# Patient Record
Sex: MALE | Race: WHITE | HISPANIC OR LATINO
[De-identification: names, ages, dates, MRNs, and addresses within clinical notes are randomized per-mention and may not be internally consistent; named-entity substitution may affect disease eponyms.]

---

## 2022-09-07 PROBLEM — Z00.00 ENCOUNTER FOR PREVENTIVE HEALTH EXAMINATION: Status: ACTIVE | Noted: 2022-09-07

## 2022-09-08 ENCOUNTER — APPOINTMENT (OUTPATIENT)
Dept: UROLOGY | Facility: CLINIC | Age: 62
End: 2022-09-08

## 2022-09-08 VITALS
SYSTOLIC BLOOD PRESSURE: 157 MMHG | WEIGHT: 145 LBS | TEMPERATURE: 95 F | BODY MASS INDEX: 24.75 KG/M2 | HEIGHT: 64 IN | DIASTOLIC BLOOD PRESSURE: 84 MMHG | HEART RATE: 108 BPM

## 2022-09-08 DIAGNOSIS — Z78.9 OTHER SPECIFIED HEALTH STATUS: ICD-10-CM

## 2022-09-08 DIAGNOSIS — Z63.5 DISRUPTION OF FAMILY BY SEPARATION AND DIVORCE: ICD-10-CM

## 2022-09-08 PROCEDURE — 76872 US TRANSRECTAL: CPT

## 2022-09-08 PROCEDURE — 51741 ELECTRO-UROFLOWMETRY FIRST: CPT

## 2022-09-08 PROCEDURE — 99204 OFFICE O/P NEW MOD 45 MIN: CPT | Mod: 25

## 2022-09-08 PROCEDURE — 51798 US URINE CAPACITY MEASURE: CPT | Mod: 59

## 2022-09-08 SDOH — SOCIAL STABILITY - SOCIAL INSECURITY: DISRUPTION OF FAMILY BY SEPARATION AND DIVORCE: Z63.5

## 2022-09-08 NOTE — ASSESSMENT
[FreeTextEntry1] : 61 yr old male presents with BPH and severe LUTS. TRUS today showed 102 g prostate. He is a good candidate for aquablation procedure. We reviewed the procedure in detail. We discussed risks, benefits, alternatives of procedure. Patient is not interesting in lifelong medication and would like to proceed with definitive treatment. Preop labs and urine were done today. He will need medical clearance from PCP. Tentative date of procedure 10/11/22 at [facility].

## 2022-09-08 NOTE — PHYSICAL EXAM
[General Appearance - Well Developed] : well developed [General Appearance - Well Nourished] : well nourished [Normal Appearance] : normal appearance [Well Groomed] : well groomed [General Appearance - In No Acute Distress] : no acute distress [Edema] : no peripheral edema [Respiration, Rhythm And Depth] : normal respiratory rhythm and effort [Exaggerated Use Of Accessory Muscles For Inspiration] : no accessory muscle use [Abdomen Soft] : soft [Abdomen Tenderness] : non-tender [Costovertebral Angle Tenderness] : no ~M costovertebral angle tenderness [Urethral Meatus] : meatus normal [Penis Abnormality] : normal circumcised penis [Scrotum] : the scrotum was normal [Testes Tenderness] : no tenderness of the testes [Testes Mass (___cm)] : there were no testicular masses [Prostate Tenderness] : the prostate was not tender [No Prostate Nodules] : no prostate nodules [Prostate Size ___ (0-4)] : prostate size [unfilled] (scale: 0-4) [Normal Station and Gait] : the gait and station were normal for the patient's age [] : no rash [No Focal Deficits] : no focal deficits [Oriented To Time, Place, And Person] : oriented to person, place, and time [Affect] : the affect was normal [Mood] : the mood was normal [Not Anxious] : not anxious

## 2022-09-08 NOTE — LETTER BODY
[Dear  ___] : Dear  [unfilled], [Consult Letter:] : I had the pleasure of evaluating your patient, [unfilled]. [Please see my note below.] : Please see my note below. [Consult Closing:] : Thank you very much for allowing me to participate in the care of this patient.  If you have any questions, please do not hesitate to contact me. [Sincerely,] : Sincerely, [FreeTextEntry3] : Salo Diallo MD

## 2022-09-08 NOTE — REVIEW OF SYSTEMS
[Wake up at night to urinate  How many times?  ___] : wakes up to urinate [unfilled] times during the night [Strong urge to urinate] : strong urge to urinate [Bladder fullness after urinating] : bladder fullness after urinating [see HPI] : see HPI [Hesitancy] : urinary hesitancy [Nocturia] : nocturia [Negative] : Genitourinary

## 2022-09-08 NOTE — HISTORY OF PRESENT ILLNESS
[FreeTextEntry1] : 61 yr old male with CC of aquablation consult. Patient has history of BPH with severe LUTS. He voids with weak stream, frequency, post-void dribbling, nocturia 4X.  Last PSA 4/2022 2.81ng/ml. He has been on tamsulosin in the past after episode of retention but is not interested in being on lifetime medication. He had SE of retrograde ejaculation.  He denies dysuria, gross hematuria. He had a UTI a few years ago. No history of stones. Since UTI/retention he has been an saw palmetto with minimal relief. He was offered Urolift but decided against it. He is interested in aquablation.\par \par IPSS: 24 \par \par Uroflow\par Max: 2.1 ml/s\par Avg: 3.7 ml/s\par Volume: 47 ml\par PVR: 189 ml\par \par TRUS: 102 gram prostate \par \par \par PMH: denies \par PSH: denies\par FH: denies family hx of prostate CA\par Meds: saw palmetto \par Allergies: NKDA\par Social: nonsmoker, rare alcohol, works in [place] [employer]

## 2022-09-09 LAB
ALBUMIN SERPL ELPH-MCNC: 4.8 G/DL
ALP BLD-CCNC: 102 U/L
ALT SERPL-CCNC: 22 U/L
ANION GAP SERPL CALC-SCNC: 15 MMOL/L
APPEARANCE: CLEAR
APTT BLD: 34.7 SEC
AST SERPL-CCNC: 26 U/L
BACTERIA: NEGATIVE
BASOPHILS # BLD AUTO: 0.04 K/UL
BASOPHILS NFR BLD AUTO: 0.6 %
BILIRUB SERPL-MCNC: 0.8 MG/DL
BILIRUBIN URINE: NEGATIVE
BLOOD URINE: NEGATIVE
BUN SERPL-MCNC: 16 MG/DL
CALCIUM SERPL-MCNC: 10.2 MG/DL
CHLORIDE SERPL-SCNC: 101 MMOL/L
CO2 SERPL-SCNC: 25 MMOL/L
COLOR: NORMAL
CREAT SERPL-MCNC: 1.11 MG/DL
EGFR: 76 ML/MIN/1.73M2
EOSINOPHIL # BLD AUTO: 0.03 K/UL
EOSINOPHIL NFR BLD AUTO: 0.4 %
GLUCOSE QUALITATIVE U: NEGATIVE
GLUCOSE SERPL-MCNC: 91 MG/DL
HCT VFR BLD CALC: 49.7 %
HGB BLD-MCNC: 16.2 G/DL
HYALINE CASTS: 0 /LPF
IMM GRANULOCYTES NFR BLD AUTO: 0.3 %
INR PPP: 0.97 RATIO
KETONES URINE: NORMAL
LEUKOCYTE ESTERASE URINE: NEGATIVE
LYMPHOCYTES # BLD AUTO: 1.56 K/UL
LYMPHOCYTES NFR BLD AUTO: 22.3 %
MAN DIFF?: NORMAL
MCHC RBC-ENTMCNC: 29.3 PG
MCHC RBC-ENTMCNC: 32.6 GM/DL
MCV RBC AUTO: 90 FL
MICROSCOPIC-UA: NORMAL
MONOCYTES # BLD AUTO: 0.4 K/UL
MONOCYTES NFR BLD AUTO: 5.7 %
NEUTROPHILS # BLD AUTO: 4.96 K/UL
NEUTROPHILS NFR BLD AUTO: 70.7 %
NITRITE URINE: NEGATIVE
PH URINE: 6
PLATELET # BLD AUTO: 311 K/UL
POTASSIUM SERPL-SCNC: 4.2 MMOL/L
PROT SERPL-MCNC: 7.8 G/DL
PROTEIN URINE: NORMAL
PT BLD: 11.4 SEC
RBC # BLD: 5.52 M/UL
RBC # FLD: 13.5 %
RED BLOOD CELLS URINE: 3 /HPF
SODIUM SERPL-SCNC: 141 MMOL/L
SPECIFIC GRAVITY URINE: 1.02
SQUAMOUS EPITHELIAL CELLS: 0 /HPF
UROBILINOGEN URINE: NORMAL
WBC # FLD AUTO: 7.01 K/UL
WHITE BLOOD CELLS URINE: 1 /HPF

## 2022-09-12 LAB — BACTERIA UR CULT: NORMAL

## 2022-10-06 DIAGNOSIS — Z41.9 ENCOUNTER FOR PROCEDURE FOR PURPOSES OTHER THAN REMEDYING HEALTH STATE, UNSPECIFIED: ICD-10-CM

## 2022-10-10 ENCOUNTER — TRANSCRIPTION ENCOUNTER (OUTPATIENT)
Age: 62
End: 2022-10-10

## 2022-10-10 NOTE — ASU PATIENT PROFILE, ADULT - INTERNATIONAL TRAVEL
KEELY informed pt ready to return to [facility]. KEELY called [facility] to [phone] to transport. ETA 200pm. KEELY informed nurse of transport.   No

## 2022-10-11 ENCOUNTER — APPOINTMENT (OUTPATIENT)
Dept: UROLOGY | Facility: AMBULATORY SURGERY CENTER | Age: 62
End: 2022-10-11

## 2022-10-11 ENCOUNTER — INPATIENT (INPATIENT)
Facility: HOSPITAL | Age: 62
LOS: 0 days | Discharge: HOME | DRG: 714 | End: 2022-10-12
Attending: UROLOGY | Admitting: UROLOGY
Payer: COMMERCIAL

## 2022-10-11 ENCOUNTER — RESULT REVIEW (OUTPATIENT)
Age: 62
End: 2022-10-11

## 2022-10-11 ENCOUNTER — TRANSCRIPTION ENCOUNTER (OUTPATIENT)
Age: 62
End: 2022-10-11

## 2022-10-11 VITALS
RESPIRATION RATE: 16 BRPM | TEMPERATURE: 97 F | DIASTOLIC BLOOD PRESSURE: 82 MMHG | WEIGHT: 150.8 LBS | HEART RATE: 78 BPM | SYSTOLIC BLOOD PRESSURE: 133 MMHG | OXYGEN SATURATION: 98 % | HEIGHT: 64 IN

## 2022-10-11 DIAGNOSIS — N40.0 BENIGN PROSTATIC HYPERPLASIA WITHOUT LOWER URINARY TRACT SYMPTOMS: ICD-10-CM

## 2022-10-11 PROCEDURE — 52601 PROSTATECTOMY (TURP): CPT

## 2022-10-11 PROCEDURE — 88305 TISSUE EXAM BY PATHOLOGIST: CPT | Mod: 26

## 2022-10-11 PROCEDURE — S2900 ROBOTIC SURGICAL SYSTEM: CPT

## 2022-10-11 RX ORDER — HYDROMORPHONE HYDROCHLORIDE 2 MG/ML
0.5 INJECTION INTRAMUSCULAR; INTRAVENOUS; SUBCUTANEOUS ONCE
Refills: 0 | Status: DISCONTINUED | OUTPATIENT
Start: 2022-10-11 | End: 2022-10-11

## 2022-10-11 RX ORDER — SODIUM CHLORIDE 9 MG/ML
1000 INJECTION INTRAMUSCULAR; INTRAVENOUS; SUBCUTANEOUS
Refills: 0 | Status: DISCONTINUED | OUTPATIENT
Start: 2022-10-11 | End: 2022-10-12

## 2022-10-11 RX ORDER — ACETAMINOPHEN 500 MG
650 TABLET ORAL EVERY 6 HOURS
Refills: 0 | Status: DISCONTINUED | OUTPATIENT
Start: 2022-10-11 | End: 2022-10-11

## 2022-10-11 RX ORDER — ATROPA BELLADONNA AND OPIUM 16.2; 6 MG/1; MG/1
1 SUPPOSITORY RECTAL EVERY 12 HOURS
Refills: 0 | Status: DISCONTINUED | OUTPATIENT
Start: 2022-10-11 | End: 2022-10-12

## 2022-10-11 RX ORDER — HEPARIN SODIUM 5000 [USP'U]/ML
5000 INJECTION INTRAVENOUS; SUBCUTANEOUS ONCE
Refills: 0 | Status: COMPLETED | OUTPATIENT
Start: 2022-10-11 | End: 2022-10-11

## 2022-10-11 RX ORDER — HEPARIN SODIUM 5000 [USP'U]/ML
5000 INJECTION INTRAVENOUS; SUBCUTANEOUS EVERY 8 HOURS
Refills: 0 | Status: DISCONTINUED | OUTPATIENT
Start: 2022-10-11 | End: 2022-10-12

## 2022-10-11 RX ORDER — OXYBUTYNIN CHLORIDE 5 MG
5 TABLET ORAL EVERY 8 HOURS
Refills: 0 | Status: DISCONTINUED | OUTPATIENT
Start: 2022-10-11 | End: 2022-10-12

## 2022-10-11 RX ORDER — ACETAMINOPHEN 500 MG
1000 TABLET ORAL ONCE
Refills: 0 | Status: COMPLETED | OUTPATIENT
Start: 2022-10-11 | End: 2022-10-11

## 2022-10-11 RX ORDER — ACETAMINOPHEN 500 MG
650 TABLET ORAL EVERY 6 HOURS
Refills: 0 | Status: DISCONTINUED | OUTPATIENT
Start: 2022-10-11 | End: 2022-10-12

## 2022-10-11 RX ADMIN — Medication 5 MILLIGRAM(S): at 13:21

## 2022-10-11 RX ADMIN — Medication 400 MILLIGRAM(S): at 14:15

## 2022-10-11 RX ADMIN — Medication 1 TABLET(S): at 19:27

## 2022-10-11 RX ADMIN — HYDROMORPHONE HYDROCHLORIDE 0.5 MILLIGRAM(S): 2 INJECTION INTRAMUSCULAR; INTRAVENOUS; SUBCUTANEOUS at 14:26

## 2022-10-11 RX ADMIN — Medication 1000 MILLIGRAM(S): at 14:30

## 2022-10-11 RX ADMIN — SODIUM CHLORIDE 80 MILLILITER(S): 9 INJECTION INTRAMUSCULAR; INTRAVENOUS; SUBCUTANEOUS at 17:32

## 2022-10-11 RX ADMIN — HYDROMORPHONE HYDROCHLORIDE 0.5 MILLIGRAM(S): 2 INJECTION INTRAMUSCULAR; INTRAVENOUS; SUBCUTANEOUS at 14:30

## 2022-10-11 NOTE — PROGRESS NOTE ADULT - SUBJECTIVE AND OBJECTIVE BOX
Post OP/Procedure note: DEENA [id number]    Patient reports to minimal abdominal and suprapubic discomfort appropriate to current state. He denies any nausea, vomiting, chest pain, sob, dizziness, weakness.     PE  GEN: NAD  ABD: soft, nt, nd  : 22fr 3 way catheter in, CBI on, urine light punch    T(C): 36.3 (10-11-22 @ 15:51), Max: 36.3 (10-11-22 @ 15:51)  HR: 78 (10-11-22 @ 15:51) (62 - 86)  BP: 135/88 (10-11-22 @ 15:51) (118/66 - 160/98)  RR: 17 (10-11-22 @ 15:51) (12 - 27)  SpO2: 99% (10-11-22 @ 15:51) (95% - 100%)    10-11-22 @ 07:01  -  10-11-22 @ 18:43  --------------------------------------------------------  IN: 640 mL / OUT: 0 mL / NET: 640 mL

## 2022-10-11 NOTE — H&P ADULT - NSHPPHYSICALEXAM_GEN_ALL_CORE
well developed, well nourished, normal appearance, well groomed and no acute distress.   Cardiovascular. no peripheral edema.   Pulmonary: no respiratory distress, normal respiratory rhythm and effort and no accessory muscle use.   Abdomen: soft, non-tender and no costovertebral angle tenderness.   Genitourinary: meatus normal, normal circumcised penis, the scrotum was normal, no tenderness of the testes, there were no testicular masses, the prostate was not tender and no prostate nodules . prostate size 2 1/2+ (scale: 0-4).   Musculoskeletal:. the gait and station were normal for the patient's age.   Skin: no rash.   Neurological: no focal deficits.   Psychiatric: oriented to person, place, and time, the affect was normal, the mood was normal and not anxious.

## 2022-10-11 NOTE — PATIENT PROFILE ADULT - FALL HARM RISK - HARM RISK INTERVENTIONS

## 2022-10-11 NOTE — H&P ADULT - HISTORY OF PRESENT ILLNESS
61 yr old male with CC of aquablation consult. Patient has history of BPH with severe LUTS. He voids with weak stream, frequency, post-void dribbling, nocturia 4X. Last PSA 4/2022 2.81ng/ml. He has been on tamsulosin in the past after episode of retention but is not interested in being on lifetime medication. He had SE of retrograde ejaculation. He denies dysuria, gross hematuria. He had a UTI a few years ago. No history of stones. Since UTI/retention he has been an saw palmetto with minimal relief. He was offered Urolift but decided against it. He is interested in aquablation.    IPSS: 24     Uroflow  Max: 2.1 ml/s  Avg: 3.7 ml/s  Volume: 47 ml  PVR: 189 ml    TRUS: 102 gram prostate       PMH: denies   PSH: denies  FH: denies family hx of prostate CA  Meds: saw palmetto   Allergies: NKDA  Social: nonsmoker, rare alcohol, works in [place] [employer]

## 2022-10-12 ENCOUNTER — TRANSCRIPTION ENCOUNTER (OUTPATIENT)
Age: 62
End: 2022-10-12

## 2022-10-12 VITALS
RESPIRATION RATE: 16 BRPM | SYSTOLIC BLOOD PRESSURE: 129 MMHG | HEART RATE: 86 BPM | TEMPERATURE: 98 F | OXYGEN SATURATION: 100 % | DIASTOLIC BLOOD PRESSURE: 85 MMHG

## 2022-10-12 PROBLEM — E78.00 PURE HYPERCHOLESTEROLEMIA, UNSPECIFIED: Chronic | Status: ACTIVE | Noted: 2022-10-10

## 2022-10-12 PROBLEM — Z87.438 PERSONAL HISTORY OF OTHER DISEASES OF MALE GENITAL ORGANS: Chronic | Status: ACTIVE | Noted: 2022-10-10

## 2022-10-12 LAB
ANION GAP SERPL CALC-SCNC: 7 MMOL/L — SIGNIFICANT CHANGE UP (ref 5–17)
BASOPHILS # BLD AUTO: 0.01 K/UL — SIGNIFICANT CHANGE UP (ref 0–0.2)
BASOPHILS NFR BLD AUTO: 0.1 % — SIGNIFICANT CHANGE UP (ref 0–2)
BUN SERPL-MCNC: 17 MG/DL — SIGNIFICANT CHANGE UP (ref 7–23)
CALCIUM SERPL-MCNC: 8.6 MG/DL — SIGNIFICANT CHANGE UP (ref 8.4–10.5)
CHLORIDE SERPL-SCNC: 106 MMOL/L — SIGNIFICANT CHANGE UP (ref 96–108)
CO2 SERPL-SCNC: 25 MMOL/L — SIGNIFICANT CHANGE UP (ref 22–31)
CREAT SERPL-MCNC: 1.03 MG/DL — SIGNIFICANT CHANGE UP (ref 0.5–1.3)
EGFR: 83 ML/MIN/1.73M2 — SIGNIFICANT CHANGE UP
EOSINOPHIL # BLD AUTO: 0 K/UL — SIGNIFICANT CHANGE UP (ref 0–0.5)
EOSINOPHIL NFR BLD AUTO: 0 % — SIGNIFICANT CHANGE UP (ref 0–6)
GLUCOSE SERPL-MCNC: 167 MG/DL — HIGH (ref 70–99)
HCT VFR BLD CALC: 32.7 % — LOW (ref 39–50)
HCV AB S/CO SERPL IA: 0.03 S/CO — SIGNIFICANT CHANGE UP
HCV AB SERPL-IMP: SIGNIFICANT CHANGE UP
HGB BLD-MCNC: 10.8 G/DL — LOW (ref 13–17)
IMM GRANULOCYTES NFR BLD AUTO: 0.5 % — SIGNIFICANT CHANGE UP (ref 0–0.9)
LYMPHOCYTES # BLD AUTO: 1.19 K/UL — SIGNIFICANT CHANGE UP (ref 1–3.3)
LYMPHOCYTES # BLD AUTO: 9.5 % — LOW (ref 13–44)
MCHC RBC-ENTMCNC: 29.5 PG — SIGNIFICANT CHANGE UP (ref 27–34)
MCHC RBC-ENTMCNC: 33 GM/DL — SIGNIFICANT CHANGE UP (ref 32–36)
MCV RBC AUTO: 89.3 FL — SIGNIFICANT CHANGE UP (ref 80–100)
MONOCYTES # BLD AUTO: 0.55 K/UL — SIGNIFICANT CHANGE UP (ref 0–0.9)
MONOCYTES NFR BLD AUTO: 4.4 % — SIGNIFICANT CHANGE UP (ref 2–14)
NEUTROPHILS # BLD AUTO: 10.68 K/UL — HIGH (ref 1.8–7.4)
NEUTROPHILS NFR BLD AUTO: 85.5 % — HIGH (ref 43–77)
NRBC # BLD: 0 /100 WBCS — SIGNIFICANT CHANGE UP (ref 0–0)
PLATELET # BLD AUTO: 258 K/UL — SIGNIFICANT CHANGE UP (ref 150–400)
POTASSIUM SERPL-MCNC: 4.3 MMOL/L — SIGNIFICANT CHANGE UP (ref 3.5–5.3)
POTASSIUM SERPL-SCNC: 4.3 MMOL/L — SIGNIFICANT CHANGE UP (ref 3.5–5.3)
RBC # BLD: 3.66 M/UL — LOW (ref 4.2–5.8)
RBC # FLD: 12.7 % — SIGNIFICANT CHANGE UP (ref 10.3–14.5)
SODIUM SERPL-SCNC: 138 MMOL/L — SIGNIFICANT CHANGE UP (ref 135–145)
WBC # BLD: 12.49 K/UL — HIGH (ref 3.8–10.5)
WBC # FLD AUTO: 12.49 K/UL — HIGH (ref 3.8–10.5)

## 2022-10-12 PROCEDURE — 36415 COLL VENOUS BLD VENIPUNCTURE: CPT

## 2022-10-12 PROCEDURE — 85025 COMPLETE CBC W/AUTO DIFF WBC: CPT

## 2022-10-12 PROCEDURE — 80048 BASIC METABOLIC PNL TOTAL CA: CPT

## 2022-10-12 PROCEDURE — S2900: CPT

## 2022-10-12 PROCEDURE — 86803 HEPATITIS C AB TEST: CPT

## 2022-10-12 PROCEDURE — C9399: CPT

## 2022-10-12 PROCEDURE — 88305 TISSUE EXAM BY PATHOLOGIST: CPT

## 2022-10-12 RX ORDER — SODIUM CHLORIDE 9 MG/ML
750 INJECTION INTRAMUSCULAR; INTRAVENOUS; SUBCUTANEOUS ONCE
Refills: 0 | Status: COMPLETED | OUTPATIENT
Start: 2022-10-12 | End: 2022-10-12

## 2022-10-12 RX ORDER — AZTREONAM 2 G
1 VIAL (EA) INJECTION
Qty: 6 | Refills: 0
Start: 2022-10-12 | End: 2022-10-14

## 2022-10-12 RX ADMIN — HEPARIN SODIUM 5000 UNIT(S): 5000 INJECTION INTRAVENOUS; SUBCUTANEOUS at 07:49

## 2022-10-12 RX ADMIN — Medication 1 TABLET(S): at 06:17

## 2022-10-12 RX ADMIN — SODIUM CHLORIDE 750 MILLILITER(S): 9 INJECTION INTRAMUSCULAR; INTRAVENOUS; SUBCUTANEOUS at 10:48

## 2022-10-12 RX ADMIN — HEPARIN SODIUM 5000 UNIT(S): 5000 INJECTION INTRAVENOUS; SUBCUTANEOUS at 00:10

## 2022-10-12 NOTE — DISCHARGE NOTE PROVIDER - NSDCFUADDINST_GEN_ALL_CORE_FT
[facility] Catheter Instructions:  - Please care for and empty urinary catheter bag as instructed by nurse.    General Discharge Instructions:  Use Tylenol for pain control as needed  Please resume all regular home medications unless specifically advised not to take a particular medication. Also, please take any new medications as prescribed.  Please get plenty of rest, continue to ambulate several times per day, and drink adequate amounts of fluids.     Warning Signs:  Please call your doctor if you experience the following:  *You experience new chest pain, pressure, squeezing or tightness.  *New or worsening cough, shortness of breath, or wheeze.  *If you are vomiting and cannot keep down fluids or your medications.  *You are getting dehydrated due to continued vomiting, diarrhea, or other reasons. Signs of dehydration include dry mouth, rapid heartbeat, or feeling dizzy or faint when standing.  *You see blood or dark/black material when you vomit or have a bowel movement.  *You experience burning when you urinate, have blood in your urine, or experience a discharge.  *Your pain is not improving within 8-12 hours or is not gone within 24 hours. Call or return immediately if your pain is getting worse, changes location, or moves to your chest or back.  *You have shaking chills, or fever greater than 100.4 degrees Fahrenheit.  *Any change in your symptoms, or any new symptoms that concern you.

## 2022-10-12 NOTE — PROGRESS NOTE ADULT - SUBJECTIVE AND OBJECTIVE BOX
AM Note    Pt mildy tachy o/n (103-108); other vitals wnl (/76 Satting 98%RA). EKG shows sinus rhythm (). Pt denies CP, SOB, palpitations, anxiety abd/SP pain. Denies pmhx other than BPH. He is currently on hepsubq and SCD's for DVT ppx. NAD, looks great. will continue to monitor and f/u AM labs.       Vital Signs Last 24 Hrs  T(C): 36.8 (10-11-22 @ 22:30), Max: 36.8 (10-11-22 @ 20:15)  T(F): 98.3 (10-11-22 @ 22:30), Max: 98.3 (10-11-22 @ 20:15)  HR: 108 (10-11-22 @ 22:30) (62 - 108)  BP: 126/76 (10-11-22 @ 22:30) (118/66 - 160/98)  BP(mean): 106 (10-11-22 @ 14:53) (86 - 124)  RR: 18 (10-11-22 @ 22:30) (12 - 27)  SpO2: 98% (10-11-22 @ 22:30) (95% - 100%)                 I&O's Summary    11 Oct 2022 07:01  -  12 Oct 2022 06:04  --------------------------------------------------------  IN: 1640 mL / OUT: 0 mL / NET: 1640 mL          PHYSICAL EXAM:    GEN: awake and alert  ABD: nontender, nondistended  : no suprapubic/CVAT. FC intact CBI ON draining light pink fluid  AM Note    Pt mildy tachy o/n (103-108); other vitals wnl (T 98.3 /76 Satting 98%RA). EKG shows sinus rhythm (). Pt denies CP, SOB, palpitations, anxiety abd/SP pain. Denies pmhx other than BPH. He is currently on hepsubq and SCD's for DVT ppx. NAD, looks great. will continue to monitor and f/u AM labs.       Vital Signs Last 24 Hrs  T(C): 36.8 (10-11-22 @ 22:30), Max: 36.8 (10-11-22 @ 20:15)  T(F): 98.3 (10-11-22 @ 22:30), Max: 98.3 (10-11-22 @ 20:15)  HR: 108 (10-11-22 @ 22:30) (62 - 108)  BP: 126/76 (10-11-22 @ 22:30) (118/66 - 160/98)  BP(mean): 106 (10-11-22 @ 14:53) (86 - 124)  RR: 18 (10-11-22 @ 22:30) (12 - 27)  SpO2: 98% (10-11-22 @ 22:30) (95% - 100%)                 I&O's Summary    11 Oct 2022 07:01  -  12 Oct 2022 06:04  --------------------------------------------------------  IN: 1640 mL / OUT: 0 mL / NET: 1640 mL          PHYSICAL EXAM:    GEN: awake and alert  ABD: nontender, nondistended  : no suprapubic/CVAT. FC intact CBI ON draining light pink fluid  AM Note    Pt mildy tachy o/n (103-108); other vitals wnl (T 98.3 /76 Satting 98%RA). EKG shows sinus rhythm (). Pt denies CP, SOB, palpitations, anxiety abd/SP pain. Denies pmhx other than BPH. He is currently on hepsubq and SCD's for DVT ppx. NAD, looks great. tachycardia resolved in AM vitals (HR 86). will continue to monitor and f/u AM labs.       Vital Signs Last 24 Hrs  T(C): 36.8 (10-11-22 @ 22:30), Max: 36.8 (10-11-22 @ 20:15)  T(F): 98.3 (10-11-22 @ 22:30), Max: 98.3 (10-11-22 @ 20:15)  HR: 108 (10-11-22 @ 22:30) (62 - 108)  BP: 126/76 (10-11-22 @ 22:30) (118/66 - 160/98)  BP(mean): 106 (10-11-22 @ 14:53) (86 - 124)  RR: 18 (10-11-22 @ 22:30) (12 - 27)  SpO2: 98% (10-11-22 @ 22:30) (95% - 100%)                 I&O's Summary    11 Oct 2022 07:01  -  12 Oct 2022 06:04  --------------------------------------------------------  IN: 1640 mL / OUT: 0 mL / NET: 1640 mL          PHYSICAL EXAM:    GEN: awake and alert  ABD: nontender, nondistended  : no suprapubic/CVAT. FC intact CBI ON draining light pink fluid

## 2022-10-12 NOTE — DISCHARGE NOTE PROVIDER - NSDCCPCAREPLAN_GEN_ALL_CORE_FT
PRINCIPAL DISCHARGE DIAGNOSIS  Diagnosis: BPH (benign prostatic hyperplasia)  Assessment and Plan of Treatment: s/p aqua ablation. Follow up with Dr. Diallo Friday for trial of void.

## 2022-10-12 NOTE — PROGRESS NOTE ADULT - PROBLEM SELECTOR PLAN 1
-stable  -bladder spasm regimen  -pain regimen  -DVT prophylaxis  Abx  -am labs  -DVT prophylaxis  -OOB, IS  -monitor urine color  -CBI titration
-stable  -bladder spasm regimen  -pain regimen  -DVT prophylaxis  -Abx  -am labs  -DVT prophylaxis  -OOB, IS  -monitor urine color  -CBI titration

## 2022-10-12 NOTE — DISCHARGE NOTE PROVIDER - CARE PROVIDER_API CALL
Salo Diallo)  Urology  [address] [place] [address]  Phone: [phone]  Fax: [phone]  Follow Up Time:

## 2022-10-12 NOTE — DISCHARGE NOTE PROVIDER - HOSPITAL COURSE
61 year old male with history of BPH s/p aqua ablation 10/11. Surgical course without complications. Hospitalization course unremarkable. Patient if afebrile, hemdynamically stable and optimized for discharge home with torres catheter.

## 2022-10-12 NOTE — DISCHARGE NOTE NURSING/CASE MANAGEMENT/SOCIAL WORK - PATIENT PORTAL LINK FT
You can access the FollowMyHealth Patient Portal offered by [facility] by registering at the following website: [email or web address]. By joining [facility]’s FollowMyHealth portal, you will also be able to view your health information using other applications (apps) compatible with our system.

## 2022-10-12 NOTE — DISCHARGE NOTE NURSING/CASE MANAGEMENT/SOCIAL WORK - NSDCPEFALRISK_GEN_ALL_CORE
For information on Fall & Injury Prevention, visit: [email or web address] OR  [email or web address] OR  https://www.cdc.gov/steadi/patient.html

## 2022-10-12 NOTE — DISCHARGE NOTE PROVIDER - PROVIDER TOKENS
" [facility] Emergency Department    [address]     [place] [address]    Phone:  [phone]    Fax:  [phone]                                       Milena Hamilton   MRN: [id number]    Department:  [facility] Emergency Department   Date of Visit:  4/16/2018           Patient Information     Date Of Birth          1940        Your diagnoses for this visit were:     Drug-induced constipation from pain meds after back surgery       You were seen by Brandon Bettencourt MD.      Follow-up Information     Follow up with Chuck Streeter PA-C.    Specialty:  Physician Assistant    Why:  As needed    Contact information:    [address]  [facility] [address] [phone]          Discharge Instructions       You may use the MiraLAX as needed to \"clean out.    You can also repeat a Fleet enema if needed.  I am hopeful that this will no longer be a problem now that you are off of the narcotic pain medications.  Please recheck in clinic if persistent problems or return to the ED if worse/concerns.  You may apply some yeast cream to the red area on your back side twice a day.  Lamisil, Monistat, Lotrimin, etc.  Try to keep pressure off that area also.  It was nice visiting with both of you again this morning.  I am glad you are feeling better.    Thank you for choosing [facility]. We appreciate the opportunity to meet your urgent medical needs. Please let us know if we could have done anything to make your stay more satisfying.    After discharge, please closely monitor for any new or worsening symptoms. Return to the Emergency Department if you develop any acute worsening signs or symptoms.    If you had lab work, cultures or imaging studies done during your stay, the final results may still be pending. We will call you if your plan of care needs to change. However, if you are not improving as expected, please follow up with your primary care provider or " "clinic.     Start any prescription medications that were prescribed to you and take them as directed.     Please see additional handouts that may be pertinent to your condition.  \  \    24 Hour Appointment Hotline       To make an appointment at any [facility] clinic, call [phone] ([phone]). If you don't have a family doctor or clinic, we will help you find one. [facility] clinics are conveniently located to serve the needs of you and your family.             Review of your medicines      START taking        Dose / Directions Last dose taken    polyethylene glycol powder   Commonly known as:  MIRALAX        1 capful mixed in 8 oz of fluid up to every 15 minutes as needed to \"clean out\".  Titrate to effect.   Refills:  0        sodium phosphate 7-19 GM/118ML rectal enema   Dose:  1 enema        Place 1 Bottle (1 enema) rectally once as needed   Refills:  0          Our records show that you are taking the medicines listed below. If these are incorrect, please call your family doctor or clinic.        Dose / Directions Last dose taken    ALPRAZolam 0.25 MG tablet   Commonly known as:  XANAX   Dose:  0.25 mg   Quantity:  15 tablet        Take 1 tablet (0.25 mg) by mouth nightly as needed for anxiety   Refills:  0        aspirin 81 MG tablet        1 TABLET DAILY   Refills:  0        BIOTENE DRY MOUTH Pste        Toothpaste and mouthwash   Refills:  0        levothyroxine 50 MCG tablet   Commonly known as:  SYNTHROID/LEVOTHROID   Quantity:  90 tablet        TAKE 1 TABLET (50 MCG) BY MOUTH DAILY   Refills:  2        metoprolol tartrate 25 MG tablet   Commonly known as:  LOPRESSOR   Quantity:  45 tablet        TAKE 1/2 TABLET (12.5 MG) BY MOUTH DAILY   Refills:  3        omeprazole 20 MG CR capsule   Commonly known as:  priLOSEC   Quantity:  90 capsule        TAKE ONE CAPSULE BY MOUTH ONCE DAILY   Refills:  3        prochlorperazine 5 MG tablet   Commonly known as:  COMPAZINE   Dose:  5 mg   Quantity:  5 tablet " "       Take 1 tablet (5 mg) by mouth every 6 hours as needed for nausea or vomiting   Refills:  0        simvastatin 20 MG tablet   Commonly known as:  ZOCOR   Quantity:  45 tablet        TAKE 0.5 TABLETS (10 MG) BY MOUTH AT BEDTIME (DUE FOR FASTING LABS)   Refills:  3        THERAPEUTIC MULTIVITAMIN PO        1 TABLET DAILY   Refills:  0                Prescriptions were sent or printed at these locations (2 Prescriptions)                   Other Prescriptions                Not Printed or Sent (2 of 2)         polyethylene glycol (MIRALAX) powder               sodium phosphate (FLEET ENEMA) 7-19 GM/118ML rectal enema                Orders Needing Specimen Collection     None      Pending Results     No orders found from 4/14/2018 to 4/17/2018.            Pending Culture Results     No orders found from 4/14/2018 to 4/17/2018.            Pending Results Instructions     If you had any lab results that were not finalized at the time of your Discharge, you can call the ED Lab Result RN at [phone]. You will be contacted by this team for any positive Lab results or changes in treatment. The nurses are available 7 days a week from 10A to 6:30P.  You can leave a message 24 hours per day and they will return your call.        Thank you for choosing [facility]       Thank you for choosing [facility] for your care. Our goal is always to provide you with excellent care. Hearing back from our patients is one way we can continue to improve our services. Please take a few minutes to complete the written survey that you may receive in the mail after you visit with us. Thank you!        [employer] Information     [employer] lets you send messages to your doctor, view your test results, renew your prescriptions, schedule appointments and more. To sign up, go to [email or web address] . Click on \"Log in\" on the left side of the screen, which will take you to the Welcome page. Then click on \"Sign up Now\" on the right side of the page. "     You will be asked to enter the access code listed below, as well as some personal information. Please follow the directions to create your username and password.     Your access code is: [email or web address]  Expires: 2018  4:32 PM     Your access code will  in 90 days. If you need help or a new code, please call your [facility] clinic or [phone].        Care EveryWhere ID     This is your Care EveryWhere ID. This could be used by other organizations to access your [facility] medical records  [id number]        Equal Access to Services     [facility]: Mekhi Rivera, sheyla young, anthony driscoll, michelle jones . So [facility] [phone].    ATENCIÓN: Si habla español, tiene a miller disposición servicios gratuitos de asistencia lingüística. Mignon al [phone].    We comply with applicable federal civil rights laws and [place] laws. We do not discriminate on the basis of race, color, national origin, age, disability, sex, sexual orientation, or gender identity.            After Visit Summary       This is your record. Keep this with you and show to your community pharmacist(s) and doctor(s) at your next visit.                   PROVIDER:[TOKEN:[[id number]]]

## 2022-10-14 ENCOUNTER — APPOINTMENT (OUTPATIENT)
Dept: UROLOGY | Facility: AMBULATORY SURGERY CENTER | Age: 62
End: 2022-10-14

## 2022-10-14 VITALS
HEART RATE: 102 BPM | BODY MASS INDEX: 24.75 KG/M2 | WEIGHT: 145 LBS | DIASTOLIC BLOOD PRESSURE: 73 MMHG | TEMPERATURE: 96 F | HEIGHT: 64 IN | SYSTOLIC BLOOD PRESSURE: 144 MMHG

## 2022-10-14 PROCEDURE — 51741 ELECTRO-UROFLOWMETRY FIRST: CPT

## 2022-10-14 PROCEDURE — A4218: CPT | Mod: NC

## 2022-10-14 PROCEDURE — 51798 US URINE CAPACITY MEASURE: CPT | Mod: 59

## 2022-10-14 PROCEDURE — 51700 IRRIGATION OF BLADDER: CPT

## 2022-10-14 PROCEDURE — 99213 OFFICE O/P EST LOW 20 MIN: CPT | Mod: 25

## 2022-10-17 LAB — SURGICAL PATHOLOGY STUDY: SIGNIFICANT CHANGE UP

## 2022-10-17 NOTE — LETTER BODY
[Dear  ___] : Dear  [unfilled], [Courtesy Letter:] : I had the pleasure of seeing your patient, [unfilled], in my office today. [Please see my note below.] : Please see my note below. [Consult Closing:] : Thank you very much for allowing me to participate in the care of this patient.  If you have any questions, please do not hesitate to contact me. [Sincerely,] : Sincerely, [FreeTextEntry3] : Salo Diallo MD

## 2022-10-17 NOTE — HISTORY OF PRESENT ILLNESS
[FreeTextEntry1] : The patient is 3 days status post aqua ablation of the prostate.  He presents today for a voiding trial.  He is without clinical complaints.

## 2022-10-17 NOTE — PHYSICAL EXAM
[Urethral Meatus] : meatus normal [Penis Abnormality] : normal circumcised penis [Scrotum] : the scrotum was normal [Testes Tenderness] : no tenderness of the testes [Testes Mass (___cm)] : there were no testicular masses [No Prostate Nodules] : no prostate nodules [Prostate Tenderness] : the prostate was not tender [Prostate Size ___ (0-4)] : prostate size [unfilled] (scale: 0-4) [FreeTextEntry1] : catheter in place, draining clear urine

## 2022-10-17 NOTE — ASSESSMENT
[FreeTextEntry1] : The patient is stable following his procedure and was able to void today with adequate bladder emptying.  He will be discharged without a catheter for stable will come back in 3 weeks.

## 2022-10-18 DIAGNOSIS — N40.1 BENIGN PROSTATIC HYPERPLASIA WITH LOWER URINARY TRACT SYMPTOMS: ICD-10-CM

## 2022-11-07 ENCOUNTER — APPOINTMENT (OUTPATIENT)
Dept: UROLOGY | Facility: CLINIC | Age: 62
End: 2022-11-07

## 2022-11-07 DIAGNOSIS — R39.198 OTHER DIFFICULTIES WITH MICTURITION: ICD-10-CM

## 2022-11-07 DIAGNOSIS — R33.9 RETENTION OF URINE, UNSPECIFIED: ICD-10-CM

## 2022-11-07 DIAGNOSIS — R35.1 NOCTURIA: ICD-10-CM

## 2022-11-07 PROCEDURE — 99214 OFFICE O/P EST MOD 30 MIN: CPT | Mod: 25

## 2022-11-07 PROCEDURE — 51798 US URINE CAPACITY MEASURE: CPT

## 2022-11-08 PROBLEM — R35.1 NOCTURIA MORE THAN TWICE PER NIGHT: Status: ACTIVE | Noted: 2022-09-08

## 2022-11-08 PROBLEM — R33.9 INCOMPLETE BLADDER EMPTYING: Status: RESOLVED | Noted: 2022-09-08 | Resolved: 2022-11-08

## 2022-11-08 PROBLEM — R39.198 SLOW URINARY STREAM: Status: RESOLVED | Noted: 2022-09-08 | Resolved: 2022-11-08

## 2022-11-08 NOTE — ASSESSMENT
[FreeTextEntry1] : The patient is stable 1 month post aquablation of the prostate with much improved stream and bladder emptying. He was reassured about the current symptoms which should gradually ease. We will reevaluate him in 2 months.

## 2022-11-08 NOTE — REVIEW OF SYSTEMS
[Pain during urination] : pain during urination [Pain at onset of urination] : pain at onset of urination [Pain after urination] : pain after urination [Wake up at night to urinate  How many times?  ___] : wakes up to urinate [unfilled] times during the night [see HPI] : see HPI [Nocturia] : nocturia [Genital bacterial infection] : denies genital bacterial infection [Genital yeast infection] : denies genital yeast infection [Sexually Transmitted Disease (STD)] : denies sexually transmitted disease [Urine Infection (bladder/kidney)] : denies bladder/kidney infections [Blood in urine that you can see] : denies seeing blood in urine [Told you have blood in urine on a urine test] : denies being told that blood was present in a urine test [Discharge from urine canal] : denies discharge from urine canal [History of kidney stones] : denies history of kidney stones [Urine retention] : denies urine retention [Strong urge to urinate] : strong urge to urinate [Bladder pressure] : denies bladder pressure [Strain or push to urinate] : denies straining or pushing to urinate [Wait a long time to urinate] : denies waiting a long time to urinate [Slow urine stream] : denies slow urine stream [Interrupted urine stream] : denies interrupted urine stream [Bladder fullness after urinating] : bladder fullness after urinating [Increased pain/discomfort with bladder filling] : denies increased pain/discomfort with bladder filling [Bladder problems as child. If yes, describe..] : denies bladder problems as child [Leakage of urine with straining, coughing, laughing] : denies leakage of urine with straining, coughing, and/or laughing [Unaware of when urine is leaking] : aware of when urine is leaking [Negative] : Heme/Lymph

## 2022-11-08 NOTE — HISTORY OF PRESENT ILLNESS
[FreeTextEntry1] : Patient is reporting much improved urinary stream. He has intermittent discomfort at the tipf of his penis, but denies dysuria or hematuria. He does report some discomfort with erections. Still with nocturia x 4-5

## 2022-11-08 NOTE — PHYSICAL EXAM
[Urethral Meatus] : meatus normal [Penis Abnormality] : normal circumcised penis [Scrotum] : the scrotum was normal [Testes Tenderness] : no tenderness of the testes [Testes Mass (___cm)] : there were no testicular masses

## 2023-01-11 ENCOUNTER — APPOINTMENT (OUTPATIENT)
Dept: UROLOGY | Facility: CLINIC | Age: 63
End: 2023-01-11
Payer: COMMERCIAL

## 2023-01-11 VITALS
HEIGHT: 64 IN | SYSTOLIC BLOOD PRESSURE: 178 MMHG | WEIGHT: 145 LBS | TEMPERATURE: 98 F | OXYGEN SATURATION: 99 % | HEART RATE: 111 BPM | BODY MASS INDEX: 24.75 KG/M2 | DIASTOLIC BLOOD PRESSURE: 93 MMHG

## 2023-01-11 PROCEDURE — 99214 OFFICE O/P EST MOD 30 MIN: CPT | Mod: 25

## 2023-01-11 PROCEDURE — 51741 ELECTRO-UROFLOWMETRY FIRST: CPT

## 2023-01-11 PROCEDURE — 51798 US URINE CAPACITY MEASURE: CPT

## 2023-01-11 NOTE — ASSESSMENT
[FreeTextEntry1] : The patient is stable 3-month status post aqua ablation of the prostate with much improved bladder emptying and decrease irritative symptoms.  We will send his serum for PSA determination and if that is unremarkable, we will see him again in 3 months.

## 2023-01-11 NOTE — HISTORY OF PRESENT ILLNESS
[FreeTextEntry1] : The patient is reporting further improvement in his urination.  He still has mild frequency and nocturia x2-3 but his discomfort has essentially resolved.  He does have some postvoid dribbling and has been ejaculating small volumes.

## 2023-01-11 NOTE — REVIEW OF SYSTEMS
[Wake up at night to urinate  How many times?  ___] : wakes up to urinate [unfilled] times during the night [Bladder fullness after urinating] : bladder fullness after urinating [see HPI] : see HPI [Nocturia] : nocturia [Genital bacterial infection] : denies genital bacterial infection [Genital yeast infection] : denies genital yeast infection [Sexually Transmitted Disease (STD)] : denies sexually transmitted disease [Urine Infection (bladder/kidney)] : denies bladder/kidney infections [Pain during urination] : pain during urination [Pain at onset of urination] : pain at onset of urination [Pain after urination] : pain after urination [Blood in urine that you can see] : denies seeing blood in urine [Told you have blood in urine on a urine test] : denies being told that blood was present in a urine test [Discharge from urine canal] : denies discharge from urine canal [History of kidney stones] : denies history of kidney stones [Urine retention] : denies urine retention [Strong urge to urinate] : strong urge to urinate [Bladder pressure] : denies bladder pressure [Strain or push to urinate] : denies straining or pushing to urinate [Wait a long time to urinate] : denies waiting a long time to urinate [Slow urine stream] : denies slow urine stream [Interrupted urine stream] : denies interrupted urine stream [Increased pain/discomfort with bladder filling] : denies increased pain/discomfort with bladder filling [Bladder problems as child. If yes, describe..] : denies bladder problems as child [Leakage of urine with straining, coughing, laughing] : denies leakage of urine with straining, coughing, and/or laughing [Unaware of when urine is leaking] : aware of when urine is leaking [Negative] : Heme/Lymph

## 2023-01-11 NOTE — PHYSICAL EXAM
[Urethral Meatus] : meatus normal [Penis Abnormality] : normal circumcised penis [Scrotum] : the scrotum was normal [Testes Tenderness] : no tenderness of the testes [Testes Mass (___cm)] : there were no testicular masses [Prostate Tenderness] : the prostate was not tender [No Prostate Nodules] : no prostate nodules [Prostate Size ___ (0-4)] : prostate size [unfilled] (scale: 0-4)

## 2023-01-12 LAB — PSA SERPL-MCNC: 2.28 NG/ML

## 2023-04-19 ENCOUNTER — APPOINTMENT (OUTPATIENT)
Dept: UROLOGY | Facility: CLINIC | Age: 63
End: 2023-04-19
Payer: COMMERCIAL

## 2023-04-19 VITALS
TEMPERATURE: 98 F | HEIGHT: 64 IN | DIASTOLIC BLOOD PRESSURE: 83 MMHG | HEART RATE: 80 BPM | SYSTOLIC BLOOD PRESSURE: 130 MMHG | BODY MASS INDEX: 24.75 KG/M2 | WEIGHT: 145 LBS

## 2023-04-19 DIAGNOSIS — N39.41 URGE INCONTINENCE: ICD-10-CM

## 2023-04-19 PROCEDURE — 99214 OFFICE O/P EST MOD 30 MIN: CPT | Mod: 25

## 2023-04-19 PROCEDURE — 51798 US URINE CAPACITY MEASURE: CPT

## 2023-04-19 PROCEDURE — 51741 ELECTRO-UROFLOWMETRY FIRST: CPT

## 2023-04-20 PROBLEM — N39.41 URGE INCONTINENCE OF URINE: Status: ACTIVE | Noted: 2023-04-19

## 2023-04-20 NOTE — REVIEW OF SYSTEMS
[Negative] : Heme/Lymph [see HPI] : see HPI [Incontinence] : incontinence [Nocturia] : nocturia [Pain during urination] : pain during urination [Pain at onset of urination] : pain at onset of urination [Pain after urination] : pain after urination [Wake up at night to urinate  How many times?  ___] : wakes up to urinate [unfilled] times during the night [Strong urge to urinate] : strong urge to urinate [Bladder fullness after urinating] : bladder fullness after urinating [Genital bacterial infection] : denies genital bacterial infection [Genital yeast infection] : denies genital yeast infection [Sexually Transmitted Disease (STD)] : denies sexually transmitted disease [Urine Infection (bladder/kidney)] : denies bladder/kidney infections [Blood in urine that you can see] : denies seeing blood in urine [Told you have blood in urine on a urine test] : denies being told that blood was present in a urine test [Discharge from urine canal] : denies discharge from urine canal [History of kidney stones] : denies history of kidney stones [Urine retention] : denies urine retention [Bladder pressure] : denies bladder pressure [Strain or push to urinate] : denies straining or pushing to urinate [Wait a long time to urinate] : denies waiting a long time to urinate [Slow urine stream] : denies slow urine stream [Interrupted urine stream] : denies interrupted urine stream [Increased pain/discomfort with bladder filling] : denies increased pain/discomfort with bladder filling [Bladder problems as child. If yes, describe..] : denies bladder problems as child [Leakage of urine with straining, coughing, laughing] : denies leakage of urine with straining, coughing, and/or laughing [Unaware of when urine is leaking] : aware of when urine is leaking

## 2023-04-20 NOTE — ASSESSMENT
[FreeTextEntry1] : The patient is clinically stable with mild lower urinary tract symptoms and adequate bladder emptying.  We discussed some bladder training techniques to improve his urinary frequency. If all else remains stable we will see him again in 3 months.  If his symptoms are still bothersome, we will consider overactive bladder medication at that point.\par \par

## 2023-04-20 NOTE — PHYSICAL EXAM
[General Appearance - Well Developed] : well developed [General Appearance - Well Nourished] : well nourished [Normal Appearance] : normal appearance [Well Groomed] : well groomed [General Appearance - In No Acute Distress] : no acute distress [Abdomen Soft] : soft [Abdomen Tenderness] : non-tender [Costovertebral Angle Tenderness] : no ~M costovertebral angle tenderness [Urethral Meatus] : meatus normal [Penis Abnormality] : normal circumcised penis [Urinary Bladder Findings] : the bladder was normal on palpation [Scrotum] : the scrotum was normal [Testes Tenderness] : no tenderness of the testes [Testes Mass (___cm)] : there were no testicular masses [Prostate Tenderness] : the prostate was not tender [No Prostate Nodules] : no prostate nodules [Prostate Size ___ (0-4)] : prostate size [unfilled] (scale: 0-4) [Edema] : no peripheral edema [] : no respiratory distress [Respiration, Rhythm And Depth] : normal respiratory rhythm and effort [Oriented To Time, Place, And Person] : oriented to person, place, and time [Exaggerated Use Of Accessory Muscles For Inspiration] : no accessory muscle use [Affect] : the affect was normal [Mood] : the mood was normal [Not Anxious] : not anxious [Normal Station and Gait] : the gait and station were normal for the patient's age [No Focal Deficits] : no focal deficits [No Palpable Adenopathy] : no palpable adenopathy [Epididymis] : the epididymides were normal

## 2023-04-20 NOTE — ADDENDUM
[FreeTextEntry1] : Next visit: uroflow,PVR\par \par Reva SMITH completed this note as a scribe on behalf of Dr. Salo Diallo M.D. \par \par The documentation recorded by the scribe accuracy reflects the service I personally preformed and the decisions made by me.

## 2023-04-20 NOTE — HISTORY OF PRESENT ILLNESS
[FreeTextEntry1] : The patient is 6 months status post aqua ablation of the prostate with complaints of urge incontinence (on 2 occasions) and persistent post void dribble. He is voiding with some frequency and nocturia x2; he denies dysuria or gross hematuria. PSA on 01/12/2023 was 2.28 ng/mL.\par

## 2023-07-14 NOTE — PRE-ANESTHESIA EVALUATION ADULT - NSANTHTOBACCOSD_GEN_ALL_CORE
I called [facility] regarding an appointment that they have scheduled with Dr. Stephenie Whitney scheduled on 08/15/23     Phone was answered and caller on the other line was not responding. Another attempt will be made. A [facility] message (if applicable) has been sent to patient relaying the above information and advising patient to call [facility] and reschedule their appointment.    Patient can be scheduled in next available slot (blocks available on providers schedule)
No

## 2023-07-17 ENCOUNTER — APPOINTMENT (OUTPATIENT)
Dept: UROLOGY | Facility: CLINIC | Age: 63
End: 2023-07-17
Payer: COMMERCIAL

## 2023-07-17 VITALS — DIASTOLIC BLOOD PRESSURE: 83 MMHG | SYSTOLIC BLOOD PRESSURE: 144 MMHG | HEART RATE: 52 BPM | TEMPERATURE: 98.6 F

## 2023-07-17 DIAGNOSIS — R35.1 NOCTURIA: ICD-10-CM

## 2023-07-17 DIAGNOSIS — N40.1 BENIGN PROSTATIC HYPERPLASIA WITH LOWER URINARY TRACT SYMPMS: ICD-10-CM

## 2023-07-17 DIAGNOSIS — R39.14 BENIGN PROSTATIC HYPERPLASIA WITH LOWER URINARY TRACT SYMPMS: ICD-10-CM

## 2023-07-17 PROCEDURE — 99213 OFFICE O/P EST LOW 20 MIN: CPT | Mod: 25

## 2023-07-17 PROCEDURE — 51741 ELECTRO-UROFLOWMETRY FIRST: CPT

## 2023-07-17 PROCEDURE — 51798 US URINE CAPACITY MEASURE: CPT

## 2023-07-17 NOTE — PHYSICAL EXAM
[General Appearance - Well Developed] : well developed [General Appearance - Well Nourished] : well nourished [Normal Appearance] : normal appearance [Well Groomed] : well groomed [General Appearance - In No Acute Distress] : no acute distress [Abdomen Soft] : soft [Costovertebral Angle Tenderness] : no ~M costovertebral angle tenderness [Abdomen Tenderness] : non-tender [Urethral Meatus] : meatus normal [Urinary Bladder Findings] : the bladder was normal on palpation [Penis Abnormality] : normal circumcised penis [Scrotum] : the scrotum was normal [Epididymis] : the epididymides were normal [Testes Tenderness] : no tenderness of the testes [Testes Mass (___cm)] : there were no testicular masses [Prostate Tenderness] : the prostate was not tender [No Prostate Nodules] : no prostate nodules [Prostate Size ___ (0-4)] : prostate size [unfilled] (scale: 0-4) [Edema] : no peripheral edema [] : no respiratory distress [Respiration, Rhythm And Depth] : normal respiratory rhythm and effort [Exaggerated Use Of Accessory Muscles For Inspiration] : no accessory muscle use [Oriented To Time, Place, And Person] : oriented to person, place, and time [Affect] : the affect was normal [Mood] : the mood was normal [Not Anxious] : not anxious [Normal Station and Gait] : the gait and station were normal for the patient's age [No Focal Deficits] : no focal deficits [No Palpable Adenopathy] : no palpable adenopathy

## 2023-07-18 NOTE — HISTORY OF PRESENT ILLNESS
[FreeTextEntry1] : The patient is 9 months status post aquablation of the prostate with improved symptoms and occasional post void dribble. He is voiding adequately with nocturia x 0-1, no dysuria or gross hematuria. He continues with bladder training techniques and reports decreased frequency. Patient also reports decreased ejaculate volume. PSA on 01/12/2023 was 2.28 ng/mL.\par \par

## 2023-07-18 NOTE — ADDENDUM
[FreeTextEntry1] : Next Visit: PVR, Uroflow, PSA\par \par I, Natacha Sotelo documented this note as a scribe on behalf of Dr. Salo Diallo MD on 07/17/2023 \par \par The documentation recorded by the scribe accuracy reflects the service I personally performed and the decisions made by me.\par

## 2023-07-18 NOTE — ASSESSMENT
[FreeTextEntry1] : The patient is clinically stable with adequate bladder emptying. He will continue with bladder training techniques. If all else remains stable, we will see him again in 6 months. \par

## 2023-07-18 NOTE — REVIEW OF SYSTEMS
[Wake up at night to urinate  How many times?  ___] : wakes up to urinate [unfilled] times during the night [Negative] : Heme/Lymph [see HPI] : see HPI [Nocturia] : nocturia [Pain during urination] : pain during urination [Pain at onset of urination] : pain at onset of urination [Pain after urination] : pain after urination [Strong urge to urinate] : strong urge to urinate [Bladder fullness after urinating] : bladder fullness after urinating [Incontinence] : no incontinence [Genital bacterial infection] : denies genital bacterial infection [Genital yeast infection] : denies genital yeast infection [Sexually Transmitted Disease (STD)] : denies sexually transmitted disease [Urine Infection (bladder/kidney)] : denies bladder/kidney infections [Blood in urine that you can see] : denies seeing blood in urine [Told you have blood in urine on a urine test] : denies being told that blood was present in a urine test [History of kidney stones] : denies history of kidney stones [Discharge from urine canal] : denies discharge from urine canal [Urine retention] : denies urine retention [Bladder pressure] : denies bladder pressure [Strain or push to urinate] : denies straining or pushing to urinate [Wait a long time to urinate] : denies waiting a long time to urinate [Slow urine stream] : denies slow urine stream [Interrupted urine stream] : denies interrupted urine stream [Increased pain/discomfort with bladder filling] : denies increased pain/discomfort with bladder filling [Bladder problems as child. If yes, describe..] : denies bladder problems as child [Leakage of urine with straining, coughing, laughing] : denies leakage of urine with straining, coughing, and/or laughing [Unaware of when urine is leaking] : aware of when urine is leaking

## 2023-10-06 NOTE — PATIENT PROFILE ADULT - FALL HARM RISK - ATTEMPT OOB
-- DO NOT REPLY / DO NOT REPLY ALL --  -- Message is from [facility] [employer] ([facility]) --    General Patient Message: Patient calling as he went to the pharmacy and picked up the medication   Bupropion but Patient states the pharmacy did not receive the eye drops. Patient is not sure of the name of the eye drops. Patient would like a call back to discuss further.      Alternative phone number: none    Can a detailed message be left? Yes    Message Turnaround: [place]:    Refer to site's KB page for routing instructions    Please give this turnaround time to the caller:   \"You can expect to receive a response 2-3 business days after your provider's clinical team reviews the message\"               No

## 2024-01-22 ENCOUNTER — APPOINTMENT (OUTPATIENT)
Dept: UROLOGY | Facility: CLINIC | Age: 64
End: 2024-01-22

## 2025-05-14 NOTE — ASU PREOP CHECKLIST - WAS PATIENT ON BETA BLOCKER?
"  Provider Impressions     73 year-old female self-referred for \"KIDNEY CYSTS\". She has a history of LUNG CANCER AND LUPUS. She is a retired nurse's aide. No family history of breast or prostate cancer. She has never smoked cigarettes.     01/16/21, patient arrives alone. She did have LUNG CANCER but had been a minimal smoker over 40 years ago. Her LUPUS is not being actively treated at this time. Urinalysis and urine culture were negative. Cytology was not performed. Renal ultrasound shows a 4.4 cm left SIMPLE RENAL CYST and a 2.6 cm right SIMPLE RENAL CYST. We will continue to watch and monitor. A discussion regarding etiology of cysts in relationship to tumors was conducted with the patient.     February 28, 2022, patient arrives alone. She states due to different insurance companies her medication for LUPUS is changed and she is starting to have symptoms. Dr. Michael Kemp is evaluating and following. Her RENAL CYSTS have slightly increased, on the right from 2.5 to 2.9 cm and on the left from 4.0 to 4.3 cm. We will continue to follow.     June 7, 2022, patient arrives alone. Renal ultrasound shows bilateral RENAL CYSTS are stable since February 2022. She will return in 1 year.     June 13, 2023, patient arrives with her daughter Joey, a surgical nurse. Renal ultrasound identifies bilateral SIMPLE CYSTS. Right side 3.2 cm and left side 4.17. She is now complaining of URINARY INCONTINENCE during the nighttime only when she arises out of bed. She states that she has significant leakage at that point. She would like to proceed with a cystoscopy, flow studies and a discussion of treatment options. I suspect she will either benefit from a 6 PM alpha agent or an antimuscarinic.     June 19, 2023, cystoscopy, flow studies and a discussion of treatment options. Severe erythema in the lower third of the bladder, bladder base and trigone. No evidence of stones or tumors. Flow rate 6 cc/s, total volume 26 cc, PVR 28 " cc. We will initiate Myrbetriq at the 50 mg dose at 6 PM. She will return in 3 months.     September 18, 2023, cystoscopy today shows improvement in the bladder base erythema. No evidence of stones or tumors. Flow rate 5 cc/s, total volume 16 cc, PVR 0 cc. She discontinued Myrbetriq after 4 days due to headaches. She continues with nighttime incontinence which she claims is quite significant. I will therefore try Sanctura 20 mg nightly and manipulate secondary to her symptoms. If she has side effects again, then I will refer her to Dr. Nestor Dias for further evaluation and treatment she will return in 3 months.     January 7, 2024, nurse visit, flow rate 27 cc/s PVR 0 cc.  States Sanctura 20 mg nightly is working well.     April 1, 2024, admitted to [facility] for chest pain     June 12, 2024, patient arrives alone.  She is discontinued Sanctura and Myrbetriq.  She finds that water aerobics have decreased her incontinence and does not wish to take medication.  Also, she has had an MRI of the pelvis and a CAT scan of the abdomen.  These only show simple cysts in the kidneys bilaterally.  She will return in 1 year.    May 5, 2025, telephone call, renal and bladder ultrasound identifies an echogenic structure in the dependent aspect of the bladder, malignancy versus hematoma.  We will schedule a cystoscopy.    May 14, 2025, cystoscopy today identifies a very prominent trigonal ridge which may have been identified as a possible mass on ultrasound.  She does have a significant erythematous patch directly posterior with a diameter of 2.5 cm.  We will reevaluate in 3 months.  She has had lung cancer.  Of note, although the patient discontinued Sanctura and Myrbetriq for her in continence and urgency, she states her symptoms have returned and she would like to try Gemtesa.  Otherwise, renal ultrasound identified bilateral simple cysts.     PLAN:     #1 the patient will return in 2 months with a cystoscopy and PVR    2.   Gemtesa 75 mg p.o. daily    June 2026 with a  renal ultrasound.      This note was created with voice-recognition software and was not corrected for typographical or grammatical errors   No

## (undated) DEVICE — FOLEY CATH 3-WAY 20FR 30CC LATEX LUBRICATH

## (undated) DEVICE — FOLEY CATH 3-WAY 24FR 30CC LATEX LUBRICATH

## (undated) DEVICE — TAPE SILK 3"

## (undated) DEVICE — GLV 7.5 PROTEXIS (WHITE)

## (undated) DEVICE — HANDPIECE AQUABEAM

## (undated) DEVICE — ELCTR PLASMA LOOP MEDIUM ANGLED 24FR 12-30 DEG

## (undated) DEVICE — DRAPE PROBE COVER LATEX FREE 3X96"

## (undated) DEVICE — TUBING RANGER FLUID IRRIGATION SET DISP

## (undated) DEVICE — DRSG TEGADERM 4X4.75"

## (undated) DEVICE — VENODYNE/SCD SLEEVE CALF MEDIUM

## (undated) DEVICE — UROVAC

## (undated) DEVICE — GEL AQUSNC PACKET 20GR

## (undated) DEVICE — FOLEY CATH 3-WAY 24FR 30CC LATEX HEMATURIA COUDE

## (undated) DEVICE — TUBING TUR 2 PRONG

## (undated) DEVICE — SOL IRR BAG NS 0.9% 3000ML

## (undated) DEVICE — PACK CYSTO

## (undated) DEVICE — DRAPE LITHOTOMY SMS POLY 53X105IN

## (undated) DEVICE — ELCTR PLASMA BUTTON 24FR 12-30 DEG

## (undated) DEVICE — FOLEY CATH 3-WAY 22FR 30CC LATEX LUBRICATH

## (undated) DEVICE — DRAINAGE BAG URINARY 4L

## (undated) DEVICE — FOLEY CATH 3-WAY 20FR 30CC LATEX HEMATURIA COUDE

## (undated) DEVICE — FOLEY CATH 3-WAY 22FR 30CC LATEX COUDE HOTTER

## (undated) DEVICE — ELCTR PLASMA BUTTON OVAL 24FR 12-30 DEG

## (undated) DEVICE — GOWN SURGEON VEST

## (undated) DEVICE — FOLEY CATH 3-WAY 22FR 30CC LATEX HEMATURIA COUDE

## (undated) DEVICE — SYR CATH TIP 2 OZ